# Patient Record
Sex: MALE | Race: WHITE | Employment: FULL TIME | URBAN - METROPOLITAN AREA
[De-identification: names, ages, dates, MRNs, and addresses within clinical notes are randomized per-mention and may not be internally consistent; named-entity substitution may affect disease eponyms.]

---

## 2022-03-17 PROBLEM — M17.11 PRIMARY OSTEOARTHRITIS OF RIGHT KNEE: Status: ACTIVE | Noted: 2019-07-01

## 2022-03-18 ENCOUNTER — ANESTHESIA (OUTPATIENT)
Dept: ENDOSCOPY | Facility: HOSPITAL | Age: 52
End: 2022-03-18
Payer: COMMERCIAL

## 2022-03-18 ENCOUNTER — HOSPITAL ENCOUNTER (OUTPATIENT)
Facility: HOSPITAL | Age: 52
Setting detail: HOSPITAL OUTPATIENT SURGERY
Discharge: HOME | End: 2022-03-18
Attending: INTERNAL MEDICINE | Admitting: INTERNAL MEDICINE
Payer: COMMERCIAL

## 2022-03-18 ENCOUNTER — ANESTHESIA EVENT (OUTPATIENT)
Dept: ENDOSCOPY | Facility: HOSPITAL | Age: 52
End: 2022-03-18
Payer: COMMERCIAL

## 2022-03-18 VITALS
OXYGEN SATURATION: 96 % | WEIGHT: 315 LBS | RESPIRATION RATE: 20 BRPM | TEMPERATURE: 97 F | SYSTOLIC BLOOD PRESSURE: 107 MMHG | HEART RATE: 70 BPM | BODY MASS INDEX: 45.1 KG/M2 | DIASTOLIC BLOOD PRESSURE: 51 MMHG | HEIGHT: 70 IN

## 2022-03-18 DIAGNOSIS — Z12.11 COLON CANCER SCREENING: ICD-10-CM

## 2022-03-18 PROCEDURE — 88305 TISSUE EXAM BY PATHOLOGIST: CPT | Performed by: INTERNAL MEDICINE

## 2022-03-18 PROCEDURE — 0DBL8ZX EXCISION OF TRANSVERSE COLON, VIA NATURAL OR ARTIFICIAL OPENING ENDOSCOPIC, DIAGNOSTIC: ICD-10-PCS | Performed by: INTERNAL MEDICINE

## 2022-03-18 PROCEDURE — 0DBN8ZX EXCISION OF SIGMOID COLON, VIA NATURAL OR ARTIFICIAL OPENING ENDOSCOPIC, DIAGNOSTIC: ICD-10-PCS | Performed by: INTERNAL MEDICINE

## 2022-03-18 PROCEDURE — 0DBM8ZX EXCISION OF DESCENDING COLON, VIA NATURAL OR ARTIFICIAL OPENING ENDOSCOPIC, DIAGNOSTIC: ICD-10-PCS | Performed by: INTERNAL MEDICINE

## 2022-03-18 RX ORDER — SODIUM CHLORIDE, SODIUM LACTATE, POTASSIUM CHLORIDE, CALCIUM CHLORIDE 600; 310; 30; 20 MG/100ML; MG/100ML; MG/100ML; MG/100ML
INJECTION, SOLUTION INTRAVENOUS CONTINUOUS
Status: DISCONTINUED | OUTPATIENT
Start: 2022-03-18 | End: 2022-03-18

## 2022-03-18 RX ORDER — NALOXONE HYDROCHLORIDE 0.4 MG/ML
80 INJECTION, SOLUTION INTRAMUSCULAR; INTRAVENOUS; SUBCUTANEOUS AS NEEDED
Status: DISCONTINUED | OUTPATIENT
Start: 2022-03-18 | End: 2022-03-18

## 2022-03-18 NOTE — OPERATIVE REPORT
ENDOSCOPY OPERATIVE REPORT    Patient Name:  Nisreen Hamilton  Medical Record #: [id number]  YOB: 1970  Date of Procedure: 3/18/2022    Preoperative Diagnosis:  screening    Postoperative Diagnosis: polyps    Procedure Performed: Colonoscopy with  snare and biopsy      Anesthesia Given: MAC      Cecal withdrawal time: 15 minutes        Endoscopist:   Susanne Hashimoto, MD      Procedure:   After the patient was interviewed and the procedure again discussed and questions addressed, the patient was brought to the GI Lab and monitoring of the B/P, pulse, and pulse oximetry was performed. The patient was then placed in the left lateral decubitus position and sedated with divided doses of IV medication; continuous vital signs were monitored throughout the procedure. Medical reconciliation was completed. History and physical were reviewed. Informed consent was obtained. The video colonoscope was inserted into the rectum after a digital rectal exam. The endoscope was advanced to the cecum as identified by the appendiceal orifice and ileocecal valve and then withdrawn. Upon insertion and withdrawl the mucosa was carefully examined and the preparation was Aronchick grade 2 (good, see below). The patient tolerated the procedure well. Findings: There was some bile staining and fecal debris coating the walls of the colon, especially in the right colon, possibly making identification of small, flat, or subtle lesions challenging and reduced accuracy. These areas were washed extensively and appeared to be successful    In the proximal ascending colon, the scope was then retroflexed and withdrawn to the distal ascending colon while evaluating the mucosa. The scope was then straightened and then reinserted into the cecum and withdrawn in the forward viewing position. Three (3x) 4-6 mm polyps seen in the transverse and removed with the cold snare and recovered.     A 3 mm polyp seen in the descending and removed via cold forceps. A 4 mm polyp seen in the sigmoid and removed with the cold snare and recovered. The overall appearance of the mucosa was normal.    At the anal verge the endoscope was retroverted, internal hemorrhoids were seen. No other abnormalities were identified. Throughtout the procedure vital signs were stable. Specimens:  See above      Condition on discharge from procedure:  good      PLAN:  Patient is to follow a high fiber, low fat diet and followup with primary physician for routine care. repeat colonoscopy with MAC     I will communicate results of the pathology with the patient. Plan is to discharge home when Anesthesia post-surgical assessment determines patient is stable and has met discharge criteria. The patient and  were informed of the endoscopic findings and was also given a copy of findings, postoperative instructions, and postoperative precautions.     Guy Lombard, MD  [address] [place] [address] Gastroenterology

## 2022-03-18 NOTE — BRIEF OP NOTE
Pre-Operative Diagnosis: Colon cancer screening [Z12.11]     Post-Operative Diagnosis: Polyps      Procedure Performed:   COLONOSCOPY with cold snare polypectomy x3 and forcep polypectomy x1     Surgeon(s) and Role:     * Jeremiah Cortes MD - Primary    Assistant(s):        Surgical Findings: see op     Specimen: see op     Estimated Blood Loss: No data recorded    discharge instructions given to patient are including the following . .. 1) Colon polyps were noted and removed/biopsied during the procedure. I will contact you (possible by mail, gopogo message, or possible phone call) with the pathology results. You may receive the results in [employer] before I have had a chance to review the results.         Chester Vincent MD  3/18/2022  2:21 PM

## 2022-03-22 NOTE — PROGRESS NOTES
Here are the  biopsy/pathology findings from your recent Colonoscopy :  --The polyps removed were a combination of \"adenomatous\" (precancerous polyps) and \"hyperplastic\" and other benign polyps that are not thought to be precancerous. Follow-up information: I'm sorry I missed you by phone, I have the following recommendations . ..   --eat a high fiber diet  --Repeat colonoscopy in 3 years    If you need any further assistance , please feel free to call [phone]. Thank you for letting us care for you .     Sincerely ,     Chester Vincent, [address] [facility]  [phone]

## (undated) DEVICE — 1200CC GUARDIAN II: Brand: GUARDIAN

## (undated) DEVICE — FORCEP BIOPSY RJ4 LG CAP W/ND

## (undated) DEVICE — FILTERLINE NASAL ADULT O2/CO2

## (undated) DEVICE — SNARE 9MM 230CM 2.4MM EXACTO

## (undated) DEVICE — TRAP SPEC REMOVAL ETRAP 15CM

## (undated) DEVICE — Device: Brand: DEFENDO AIR/WATER/SUCTION AND BIOPSY VALVE

## (undated) DEVICE — ENDOSCOPY PACK - LOWER: Brand: MEDLINE INDUSTRIES, INC.

## (undated) DEVICE — ENDOCUFF ADULT GREEN

## (undated) DEVICE — 3M™ RED DOT™ MONITORING ELECTRODE WITH FOAM TAPE AND STICKY GEL, 50/BAG, 20/CASE, 72/PLT 2570: Brand: RED DOT™

## (undated) NOTE — LETTER
To: Dr. Alis Davies   Date:  3/14/2022  Fax #: [phone]    Patient Name: Flory Kennedy / Sex: 10/30/1970-A: 49 y  male  Phone:  [phone]   CSN: [id number]  Medical Records: [id number]      The above patient had a positive COVID test on: _____12/18/2021_______      Per [facility] Infection Control guidelines this patient will NOT be retested for COVID  prior to their surgery/procedure on: ______3/18/2022________. Thank you.     PAT 6/23/16 KELLY